# Patient Record
Sex: FEMALE | Race: WHITE
[De-identification: names, ages, dates, MRNs, and addresses within clinical notes are randomized per-mention and may not be internally consistent; named-entity substitution may affect disease eponyms.]

---

## 2018-08-08 ENCOUNTER — HOSPITAL ENCOUNTER (EMERGENCY)
Dept: HOSPITAL 65 - ER | Age: 53
Discharge: HOME | End: 2018-08-08
Payer: COMMERCIAL

## 2018-08-08 VITALS — BODY MASS INDEX: 31.65 KG/M2 | HEIGHT: 65 IN | WEIGHT: 190 LBS

## 2018-08-08 VITALS — DIASTOLIC BLOOD PRESSURE: 77 MMHG | SYSTOLIC BLOOD PRESSURE: 132 MMHG

## 2018-08-08 VITALS — SYSTOLIC BLOOD PRESSURE: 178 MMHG | DIASTOLIC BLOOD PRESSURE: 91 MMHG

## 2018-08-08 DIAGNOSIS — Z90.710: ICD-10-CM

## 2018-08-08 DIAGNOSIS — F45.8: ICD-10-CM

## 2018-08-08 DIAGNOSIS — R79.1: ICD-10-CM

## 2018-08-08 DIAGNOSIS — Z90.49: ICD-10-CM

## 2018-08-08 DIAGNOSIS — F41.9: Primary | ICD-10-CM

## 2018-08-08 DIAGNOSIS — I10: ICD-10-CM

## 2018-08-08 DIAGNOSIS — Z79.899: ICD-10-CM

## 2018-08-08 LAB
ALP INTEST CFR SERPL: 77 U/L (ref 50–136)
ALT SERPL-CCNC: 106 U/L (ref 12–78)
APPEARANCE UR: CLEAR
AST SERPL-CCNC: 59 U/L (ref 0–35)
BASE EXCESS BLDA CALC-SCNC: 4.7 MMOL/L (ref -2–2)
BASOPHILS # BLD AUTO: 0 10^3/UL (ref 0–0.1)
BASOPHILS NFR BLD AUTO: 0.1 % (ref 0–0.2)
BILIRUB UR STRIP.AUTO-MCNC: NEGATIVE MG/DL
CALCIUM SERPL-MCNC: 9 MG/DL (ref 8.4–10.5)
CO2 BLDA-SCNC: 28.2 MMOL/L (ref 20–32)
COLOR UR: YELLOW
EOSINOPHIL # BLD AUTO: 0.2 10^3/UL (ref 0–0.2)
EOSINOPHIL NFR BLD AUTO: 2.1 % (ref 0–5)
ERYTHROCYTE [DISTWIDTH] IN BLOOD BY AUTOMATED COUNT: 12.6 % (ref 11.5–14.5)
GLUCOSE PRE 100 G GLC PO SERPL-MCNC: 123 MG/DL (ref 70–110)
HCO3 BLDA-SCNC: 21.9 MMOL/L (ref 22–26)
HGB BLD-MCNC: 12.6 G/DL (ref 12–15)
LYMPHOCYTES # BLD AUTO: 2.2 10^3/UL (ref 1–4.8)
LYMPHOCYTES NFR BLD AUTO: 27.7 % (ref 24–44)
MCH RBC QN AUTO: 30.2 PG (ref 26–34)
MCHC RBC AUTO-ENTMCNC: 33.4 G/DL (ref 33–37)
MCV RBC AUTO: 90.4 FL (ref 78–100)
MONOCYTES # BLD AUTO: 0.5 10^3/UL (ref 0.3–0.8)
MONOCYTES NFR BLD AUTO: 6.5 % (ref 5–12)
NEUTROPHILS # BLD AUTO: 5 10^3/UL (ref 1.8–7.7)
NEUTROPHILS NFR BLD AUTO: 63.3 % (ref 41–85)
PCO2 BLDA: 17.4 MMHG (ref 35–45)
PH BLDA: 7.72 [PH] (ref 7.35–7.45)
PLATELET # BLD AUTO: 263 10^3/UL (ref 150–400)
PMV BLD AUTO: 11.1 FL (ref 7.8–11)
UROBILINOGEN UR QL STRIP.AUTO: NORMAL
WBC # BLD AUTO: 8 10^3/UL (ref 4.5–11)

## 2018-08-08 PROCEDURE — 71045 X-RAY EXAM CHEST 1 VIEW: CPT

## 2018-08-08 PROCEDURE — 80053 COMPREHEN METABOLIC PANEL: CPT

## 2018-08-08 PROCEDURE — 85025 COMPLETE CBC W/AUTO DIFF WBC: CPT

## 2018-08-08 PROCEDURE — 99285 EMERGENCY DEPT VISIT HI MDM: CPT

## 2018-08-08 PROCEDURE — 81002 URINALYSIS NONAUTO W/O SCOPE: CPT

## 2018-08-08 PROCEDURE — 93005 ELECTROCARDIOGRAM TRACING: CPT

## 2018-08-08 PROCEDURE — 82803 BLOOD GASES ANY COMBINATION: CPT

## 2018-08-08 PROCEDURE — 84484 ASSAY OF TROPONIN QUANT: CPT

## 2018-08-08 PROCEDURE — 36415 COLL VENOUS BLD VENIPUNCTURE: CPT

## 2018-08-08 PROCEDURE — 85610 PROTHROMBIN TIME: CPT

## 2018-08-08 NOTE — NUR
RESTROOM



TAKEN TO RESTROOM VIA WHEELCHAIR, STATES SHE IS FEELING A LITTLE BIT BETTER NOW 
THAT SHE HAS RESTED IN THE BED.

TAKEN BACK TO ROOM, RECONNECTED TO BEDSIDE MONITOR

## 2018-08-08 NOTE — PCM.EKG
[facility]

                                       

Test Date:    2018               Test Time:    22:33:53

Pat Name:     JESÚS VENTURA             Department:   

Patient ID:   [id number]          Room:          

Gender:       F                        Technician:   MA

:          1965               Requested By: ALTON MERLOS

Order Number: [id number]           Reading MD:   Joann Lee

                                 Measurements

Intervals                              Axis          

Rate:         63                       P:            

CA:           112                      QRS:          5

QRSD:         88                       T:            7

QT:           458                                    

QTc:          468                                    

                           Interpretive Statements

Normal sinus rhythm

Normal ECG

No previous ECG available for comparison



Electronically Signed On 2018 2:19:19 CDT by Joann Lee



Please click the below link to view image of tracing.

## 2018-08-08 NOTE — DIREP
PROCEDURE:CHEST 1 VIEW

 

COMPARISON:[facility], CR, XRAY CHEST SINGLE VW, 11/13/2016, 

06:19 PM.

 

INDICATIONS:SOB

 

FINDINGS:

LUNGS/PLEURA:No significant pulmonary parenchymal abnormalities. No effusions.

VASCULATURE:Normal.  Unremarkable pulmonary vasculature.

CARDIAC:Normal.  No cardiac silhouette abnormality or cardiomegaly. 

MEDIASTINUM:Normal.  No visible mass or adenopathy. 

BONES:Normal.  No fracture or visible bony lesion. 

OTHER:Negative.  

 

CONCLUSION:No acute cardiopulmonary findings.  

 

 

 

 

Dictated by: Foreign Dunbar M.D. on 08/08/2018 at 10:14 PM     

Electronically Signed By: Foreign Dunbar M.D. on 08/08/2018 at 10:15 PM

## 2018-08-08 NOTE — ER.PDOC
General


Chief Complaint:  Syncope


Stated Complaint:  LIGHTHEADEDNESS


TRAVEL OUT OF US:  No


Time seen by MD:  21:42


Source:  patient


Exam Limitations:  no limitations





History of Present Illness


Initial Comments


Gen weakness, light headed.  Difficulty getting a full breath in.  Occas 

numbness and tingling arms and handsl.  No chest discomfort.  No nausea, no 

diaph.  Admits increased stress on job.


Timing/Duration:  other (1 month.  Not getting worse.)


Severity:  mild, moderate


Associated Symptoms:  denies symptoms


Allergies:  


Coded Allergies:  


     No Known Allergies (Unverified , 11/13/16)


Home Meds


Reported Medications


Esomeprazole Magnesium (NEXIUM) 40 Mg Capsule.dr, 1 CAP PO DAILY, #30 CAP 5 

Refills


   11/13/16


Duloxetine Hcl (CYMBALTA) 60 Mg Capsule.dr, 1 CAP PO DAILY, #90 CAP 3 Refills


   11/13/16


Furosemide (LASIX) 40 Mg Tablet, 1 TAB PO DAILY, #90 TAB 1 Refill


   11/13/16





Past Medical History


Medical History:  hypertension


Surgical History:  appendectomy, hysterectomy, other


LMP (females 10-50):  hysterectomy





Social History


Smoking:  non-smoker


Alcohol Use:  occassionally


Drug Use:  none





Review of Systems


Constitutional:  see HPI


EENTM:  no symptoms reported


Respiratory:  see HPI; denies cough


Cardiovascular:  see HPI; denies chest pain


Gastrointestinal:  no symptoms reported


Genitourinary:  no symptoms reported


Psychiatric/Neurological:  see HPI, anxiety


All Other Systems:  Reviewed and Negative





Physical Exam


General Appearance:  No Apparent Distress, Anxious


EENT:  eyes nml inspection, nml ENT inspection


Neck:  Non-Tender, Full Range of Motion, Normal Inspection


Respiratory:  chest non-tender, lungs clear, normal breath sounds (Pt. actively 

hyperventilating.)


CVS:  reg rate & rhythm, no murmur, no gallop, pulses nml


Gastrointestinal:  Normal Bowel Sounds, No Organomegaly, No Pulsatile Mass


Back:  Normal Inspection, No CVA Tenderness, No Vertebral Tenderness


Extremities:  Normal Range of Motion, Non-Tender, Normal Inspection, No Pedal 

Edema


Neurologic/Psychiatric:  CNs II-XII NML as Tested, No Motor/Sensory Deficits, 

Alert, Oriented x 3, Depressed Affect


Skin:  Normal Color, Warm/Dry


Lymphatic:  No Adenopathy





Results/Orders


Results/Orders





Laboratory Tests








Test


 8/8/18


21:45 8/8/18


21:52 8/8/18


22:43


 


Blood Gas Sample Site


 LEFT RADIAL


ARTERY 


 





 


Blood Gas pH


 7.718


(7.350-7.450) 


 





 


Blood Gas PCO2


 17.4 mmHg


(35.0-45.0) 


 





 


Blood Gas PO2


 116.1 mmHg


(75.0-100.0) 


 





 


Blood Gas HCO3


 21.9 mmol/L


(22.0-26.0) 


 





 


Blood Gas Base Excess


 4.7 mmol/L


(-2.0-2.0) 


 





 


Elio Test POSITIVE   


 


Arterial Blood Oxygen


Saturation 98.5 % (95-) 


 


 





 


Deoxyhemoglobin


 1.5 %


(0.2-0.6) 


 





 


Carboxyhemoglobin


 0.4 %


(0.5-1.5) 


 





 


Methemoglobin


 0.0 %


(0.2-0.6) 


 





 


Total Hemoglobin


 13.8 %


(13.5-17.5) 


 





 


Total Oxygen Concentration


 19.2 %


(13.5-17.5) 


 





 


Lactic Acid (Blood Gas)


 1.3 MMOL/L


(0.5-1.0) 


 





 


Blood Gas Temperature 37.0   


 


FiO2


 0.21 %


() 


 





 


Bicarbonate


 22.4 mmol/L


(23-27) 


 





 


White Blood Count


 


 8.0 10^3/uL


(4.5-11.0) 





 


Red Blood Count


 


 4.17 10^6/uL


(4.00-5.20) 





 


Hemoglobin


 


 12.6 g/dL


(12.0-15.0) 





 


Hematocrit


 


 37.7 %


(36.0-46.0) 





 


Mean Corpuscular Volume


 


 90.4 fL


() 





 


Mean Corpuscular Hemoglobin


 


 30.2 pg


(26-34) 





 


Mean Corpuscular Hemoglobin


Concent 


 33.4 g/dL


(33-37) 





 


Red Cell Distribution Width


 


 12.6 %


(11.5-14.5) 





 


Platelet Count


 


 263 10^3/uL


(150-400) 





 


Mean Platelet Volume


 


 11.1 fL


(7.8-11.0) 





 


Neutrophils (%) (Auto)


 


 63.3 %


(41.0-85.0) 





 


Lymphocytes (%) (Auto)


 


 27.7 %


(24.0-44.0) 





 


Monocytes (%) (Auto)


 


 6.5 %


(5.0-12.0) 





 


Neutrophils # (Auto)


 


 5.0 10^3/uL


(1.8-7.7) 





 


Lymphocytes # (Auto)


 


 2.2 10^3/uL


(1.0-4.8) 





 


Monocytes # (Auto)


 


 0.5 10^3/uL


(0.3-0.8) 





 


Absolute Immature Granulocyte


(auto 


 0.02 10^3 u/L


(0-2) 





 


Eosinophils %


 


 2.1 %


(0.0-5.0) 





 


Basophils %


 


 0.1 %


(0.0-0.2) 





 


Basophils #


 


 0.0 10^3/uL


(0.0-0.1) 





 


Eosinophil Count


 


 0.2 10^3/uL


(0.0-0.2) 





 


Prothrombin Time


 


 9.3 SEC


(9.8-11.9) 





 


Prothrombin Time INR


(Non-Therap) 


 0.9 


 





 


Sodium Level


 


 141 mmol/L


(132-145) 





 


Potassium Level


 


 3.2 mmol/L


(3.6-5.2) 





 


Chloride Level


 


 102.0 mmol/L


() 





 


Carbon Dioxide Level


 


 28.2 mmol/L


(20.0-32) 





 


Anion Gap  14.0  


 


Blood Urea Nitrogen


 


 14 mg/dL


(7-18) 





 


Creatinine


 


 0.93 mg/dL


(0.59-1.40) 





 


Estimated GFR (


American) 


 76.6 (>/=60) 


 





 


BUN/Creatinine Ratio  15.0  


 


Glucose Level


 


 123 mg/dL


() 





 


Calcium Level


 


 9.0 mg/dL


(8.4-10.5) 





 


Total Bilirubin


 


 0.4 mg/dL


(0.2-1.0) 





 


Aspartate Amino Transf


(AST/SGOT) 


 59 U/L (0-35) 


 





 


Alanine Aminotransferase


(ALT/SGPT) 


 106 U/L


(12-78) 





 


Alkaline Phosphatase


 


 77 U/L


() 





 


Troponin I


 


 < 0.02 ng/mL


(0.00-0.05) 





 


Total Protein


 


 7.4 g/dL


(6.4-8.2) 





 


Albumin


 


 3.6 g/dL


(3.4-5.0) 





 


Globulin  3.8  


 


Percent Immature Gran (Cell


Imm) 


 0.30 %


(0.00-0.50) 





 


Urine Collection Type   UNKNOWN 


 


Urine Color


 


 


 YELLOW


(YELLOW)


 


Urine Appearance   CLEAR (CLEAR) 


 


Urine Bilirubin


 


 


 NEGATIVE MG/DL


(NEGATIVE)


 


Urine Ketones


 


 


 NEGATIVE


(NEGATIVE)


 


Urine Specific Gravity


 


 


 1.015


(1.005-1.035)


 


Urine pH   7 (5.0-6.0) 


 


Urine Protein


 


 


 NEGATIVE


(NEGATIVE)


 


Urine Urobilinogen


 


 


 NORMAL


(NEGATIVE)


 


Urine Nitrate


 


 


 NEGATIVE


(NEGATIVE)


 


Urine Leukocyte Esterase


 


 


 NEGATIVE


(NEGATIVE)


 


Urine Blood


 


 


 NEGATIVE


(NEGATIVE)


 


Urine Glucose


 


 


 NORMAL


(NEGATIVE)











Progress


Progress


Troponin, EKG nml.  Atypical prsentation for cardiac etiology.  More typical 

for anxiety.  ABG c/w hyperventilation with resp alkylosis.  Remainder labs and 

UA nml.





EKG/XRAY/CT/US


EKG:  NSR


EKG Comments:  63/min.  Nml axis, nml EKG.


XRAY:  chest


XRAY Comments:  nml





Course


Vitals & review Data





Vital Sign - Last 24 Hours








 8/8/18 8/8/18 8/8/18 8/8/18





 21:34 21:34 21:40 22:47


 


Temp 98.1 98.1 98.1 





 98.1 98.1 98.1 


 


Pulse 67 67 67 62


 


Resp 16 16 16 16


 


B/P (MAP)   178/91 (120) 132/77 (95)


 


Pulse Ox 97  97 98


 


O2 Delivery Room Air  Room Air Room Air








Laboratory Tests








Test


 8/8/18


21:45 8/8/18


21:52 8/8/18


22:43


 


Blood Gas Sample Site


 LEFT RADIAL


ARTERY 


 





 


Blood Gas pH 7.718   


 


Blood Gas PCO2 17.4 mmHg   


 


Blood Gas PO2 116.1 mmHg   


 


Blood Gas HCO3 21.9 mmol/L   


 


Blood Gas Base Excess 4.7 mmol/L   


 


Elio Test POSITIVE   


 


Arterial Blood Oxygen


Saturation 98.5 % 


 


 





 


Deoxyhemoglobin 1.5 %   


 


Carboxyhemoglobin 0.4 %   


 


Methemoglobin 0.0 %   


 


Total Hemoglobin 13.8 %   


 


Total Oxygen Concentration 19.2 %   


 


Lactic Acid (Blood Gas) 1.3 MMOL/L   


 


Blood Gas Temperature 37.0   


 


FiO2 0.21 %   


 


Bicarbonate 22.4 mmol/L   


 


White Blood Count  8.0 10^3/uL  


 


Red Blood Count  4.17 10^6/uL  


 


Hemoglobin  12.6 g/dL  


 


Hematocrit  37.7 %  


 


Mean Corpuscular Volume  90.4 fL  


 


Mean Corpuscular Hemoglobin  30.2 pg  


 


Mean Corpuscular Hemoglobin


Concent 


 33.4 g/dL 


 





 


Red Cell Distribution Width  12.6 %  


 


Platelet Count  263 10^3/uL  


 


Mean Platelet Volume  11.1 fL  


 


Neutrophils (%) (Auto)  63.3 %  


 


Lymphocytes (%) (Auto)  27.7 %  


 


Monocytes (%) (Auto)  6.5 %  


 


Neutrophils # (Auto)  5.0 10^3/uL  


 


Lymphocytes # (Auto)  2.2 10^3/uL  


 


Monocytes # (Auto)  0.5 10^3/uL  


 


Absolute Immature Granulocyte


(auto 


 0.02 10^3 u/L 


 





 


Eosinophils %  2.1 %  


 


Basophils %  0.1 %  


 


Basophils #  0.0 10^3/uL  


 


Eosinophil Count  0.2 10^3/uL  


 


Prothrombin Time  9.3 SEC  


 


Prothrombin Time INR


(Non-Therap) 


 0.9 


 





 


Sodium Level  141 mmol/L  


 


Potassium Level  3.2 mmol/L  


 


Chloride Level  102.0 mmol/L  


 


Carbon Dioxide Level  28.2 mmol/L  


 


Anion Gap  14.0  


 


Blood Urea Nitrogen  14 mg/dL  


 


Creatinine  0.93 mg/dL  


 


Estimated GFR (


American) 


 76.6 


 





 


BUN/Creatinine Ratio  15.0  


 


Glucose Level  123 mg/dL  


 


Calcium Level  9.0 mg/dL  


 


Total Bilirubin  0.4 mg/dL  


 


Aspartate Amino Transf


(AST/SGOT) 


 59 U/L 


 





 


Alanine Aminotransferase


(ALT/SGPT) 


 106 U/L 


 





 


Alkaline Phosphatase  77 U/L  


 


Troponin I  < 0.02 ng/mL  


 


Total Protein  7.4 g/dL  


 


Albumin  3.6 g/dL  


 


Globulin  3.8  


 


Percent Immature Gran (Cell


Imm) 


 0.30 % 


 





 


Urine Collection Type   UNKNOWN 


 


Urine Color   YELLOW 


 


Urine Appearance   CLEAR 


 


Urine Bilirubin   NEGATIVE MG/DL 


 


Urine Ketones   NEGATIVE 


 


Urine Specific Gravity   1.015 


 


Urine pH   7 


 


Urine Protein   NEGATIVE 


 


Urine Urobilinogen   NORMAL 


 


Urine Nitrate   NEGATIVE 


 


Urine Leukocyte Esterase   NEGATIVE 


 


Urine Blood   NEGATIVE 


 


Urine Glucose   NORMAL 











Departure


Time of Disposition:  22:57


Disposition:  01 HOME, SELF-CARE


Impression:  


 Primary Impression:  


 Anxiety


 Additional Impression:  


 Hyperventilation syndrome


Condition:  Stable


Patient Instructions:  Anxiety and Panic Attacks


Referrals:  


MONIQUE SANCHES MD (PCP)


PRIMARY CARE PROVIDER





Additional Instructions:  


No caffeine or other stimulant drugs or medications.


See your doctor in 2 to 3 days for recheck.


Return if worse.


Duration or Time Spent with Pa:  120











ALTON MERLOS DO Aug 8, 2018 21:50
- - -

## 2018-08-09 VITALS — DIASTOLIC BLOOD PRESSURE: 77 MMHG | SYSTOLIC BLOOD PRESSURE: 132 MMHG

## 2018-08-23 ENCOUNTER — HOSPITAL ENCOUNTER (EMERGENCY)
Dept: HOSPITAL 65 - ER | Age: 53
Discharge: HOME | End: 2018-08-23
Payer: COMMERCIAL

## 2018-08-23 VITALS — SYSTOLIC BLOOD PRESSURE: 166 MMHG | DIASTOLIC BLOOD PRESSURE: 104 MMHG

## 2018-08-23 VITALS — SYSTOLIC BLOOD PRESSURE: 178 MMHG | DIASTOLIC BLOOD PRESSURE: 103 MMHG

## 2018-08-23 VITALS — SYSTOLIC BLOOD PRESSURE: 166 MMHG | DIASTOLIC BLOOD PRESSURE: 102 MMHG

## 2018-08-23 VITALS — WEIGHT: 190 LBS | HEIGHT: 65 IN | BODY MASS INDEX: 31.65 KG/M2

## 2018-08-23 VITALS — DIASTOLIC BLOOD PRESSURE: 92 MMHG | SYSTOLIC BLOOD PRESSURE: 159 MMHG

## 2018-08-23 VITALS — DIASTOLIC BLOOD PRESSURE: 103 MMHG | SYSTOLIC BLOOD PRESSURE: 178 MMHG

## 2018-08-23 DIAGNOSIS — K21.9: ICD-10-CM

## 2018-08-23 DIAGNOSIS — S10.93XA: ICD-10-CM
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
## 2018-08-23 DIAGNOSIS — S00.93XA: ICD-10-CM

## 2018-08-23 DIAGNOSIS — S20.219A: Primary | ICD-10-CM
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
## 2018-08-23 DIAGNOSIS — Z90.710: ICD-10-CM
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
## 2018-08-23 DIAGNOSIS — Z79.899: ICD-10-CM

## 2018-08-23 DIAGNOSIS — I10: ICD-10-CM

## 2018-08-23 DIAGNOSIS — R79.1: ICD-10-CM

## 2018-08-23 DIAGNOSIS — Z90.49: ICD-10-CM

## 2018-08-23 DIAGNOSIS — S30.1XXA: ICD-10-CM

## 2018-08-23 LAB
ALP INTEST CFR SERPL: 82 U/L (ref 50–136)
ALT SERPL-CCNC: 106 U/L (ref 12–78)
AST SERPL-CCNC: 46 U/L (ref 0–35)
BASOPHILS # BLD AUTO: 0 10^3/UL (ref 0–0.1)
BASOPHILS NFR BLD AUTO: 0.3 % (ref 0–0.2)
CALCIUM SERPL-MCNC: 9.5 MG/DL (ref 8.4–10.5)
CO2 BLDA-SCNC: 27.6 MMOL/L (ref 20–32)
EOSINOPHIL # BLD AUTO: 0.2 10^3/UL (ref 0–0.2)
EOSINOPHIL NFR BLD AUTO: 1.7 % (ref 0–5)
ERYTHROCYTE [DISTWIDTH] IN BLOOD BY AUTOMATED COUNT: 12.9 % (ref 11.5–14.5)
GLUCOSE PRE 100 G GLC PO SERPL-MCNC: 148 MG/DL (ref 70–110)
HGB BLD-MCNC: 13.4 G/DL (ref 12–15)
LYMPHOCYTES # BLD AUTO: 1.8 10^3/UL (ref 1–4.8)
LYMPHOCYTES NFR BLD AUTO: 20.6 % (ref 24–44)
MCH RBC QN AUTO: 30.2 PG (ref 26–34)
MCHC RBC AUTO-ENTMCNC: 33.8 G/DL (ref 33–37)
MCV RBC AUTO: 89.2 FL (ref 78–100)
MONOCYTES # BLD AUTO: 0.5 10^3/UL (ref 0.3–0.8)
MONOCYTES NFR BLD AUTO: 5.5 % (ref 5–12)
NEUTROPHILS # BLD AUTO: 6.2 10^3/UL (ref 1.8–7.7)
NEUTROPHILS NFR BLD AUTO: 71.8 % (ref 41–85)
PLATELET # BLD AUTO: 286 10^3/UL (ref 150–400)
PMV BLD AUTO: 11.5 FL (ref 7.8–11)
WBC # BLD AUTO: 8.7 10^3/UL (ref 4.5–11)

## 2018-08-23 PROCEDURE — 93005 ELECTROCARDIOGRAM TRACING: CPT

## 2018-08-23 PROCEDURE — 72125 CT NECK SPINE W/O DYE: CPT

## 2018-08-23 PROCEDURE — 70450 CT HEAD/BRAIN W/O DYE: CPT

## 2018-08-23 PROCEDURE — 99285 EMERGENCY DEPT VISIT HI MDM: CPT

## 2018-08-23 PROCEDURE — 85610 PROTHROMBIN TIME: CPT

## 2018-08-23 PROCEDURE — 71260 CT THORAX DX C+: CPT

## 2018-08-23 PROCEDURE — 85730 THROMBOPLASTIN TIME PARTIAL: CPT

## 2018-08-23 PROCEDURE — 74177 CT ABD & PELVIS W/CONTRAST: CPT

## 2018-08-23 PROCEDURE — 80053 COMPREHEN METABOLIC PANEL: CPT

## 2018-08-23 PROCEDURE — 36415 COLL VENOUS BLD VENIPUNCTURE: CPT

## 2018-08-23 PROCEDURE — 84484 ASSAY OF TROPONIN QUANT: CPT

## 2018-08-23 PROCEDURE — 85025 COMPLETE CBC W/AUTO DIFF WBC: CPT

## 2018-08-23 NOTE — PCM.EKG
[facility]

                                       

Test Date:    2018               Test Time:    19:00:01

Pat Name:     JESÚS VENTURA             Department:   

Patient ID:   [facility]-[id number]          Room:          

Gender:       F                        Technician:   

:          1965               Requested By: CURT BRODY

Order Number: [id number]           Reading MD:   Curt BORDY

                                 Measurements

Intervals                              Axis          

Rate:         57                       P:            50

VT:           156                      QRS:          26

QRSD:         82                       T:            37

QT:           442                                    

QTc:          430                                    

                           Interpretive Statements

Sinus bradycardia

Otherwise normal ECG

Compared to ECG 2018 22:33:53

Sinus rhythm no longer present



Electronically Signed On 2018 3:53:31 CDT by Curt BRODY



Please click the below link to view image of tracing.

## 2018-08-23 NOTE — NUR
IV



IV removed, tip intact. Cotton ball placed over IV site with coban to secure 
cotton. Instructed pt. to remove when she arrives home. Pt. expressed 
understand
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
## 2020-02-25 ENCOUNTER — HOSPITAL ENCOUNTER (EMERGENCY)
Dept: HOSPITAL 65 - ER | Age: 55
Discharge: HOME | End: 2020-02-25
Payer: COMMERCIAL

## 2020-02-25 VITALS — SYSTOLIC BLOOD PRESSURE: 179 MMHG | DIASTOLIC BLOOD PRESSURE: 87 MMHG

## 2020-02-25 VITALS — WEIGHT: 180 LBS | HEIGHT: 65 IN | BODY MASS INDEX: 29.99 KG/M2

## 2020-02-25 VITALS — DIASTOLIC BLOOD PRESSURE: 92 MMHG | SYSTOLIC BLOOD PRESSURE: 156 MMHG

## 2020-02-25 DIAGNOSIS — R00.1: ICD-10-CM

## 2020-02-25 DIAGNOSIS — R42: Primary | ICD-10-CM

## 2020-02-25 LAB
ALP INTEST CFR SERPL: 68 U/L (ref 50–136)
ALT SERPL-CCNC: 23 U/L (ref 12–78)
AST SERPL-CCNC: 10 U/L (ref 0–35)
BASOPHILS # BLD AUTO: 0.1 10^3/UL (ref 0–0.1)
BASOPHILS NFR BLD AUTO: 0.4 % (ref 0–0.2)
CALCIUM SERPL-MCNC: 9.9 MG/DL (ref 8.4–10.5)
CO2 BLDA-SCNC: 31.7 MMOL/L (ref 20–32)
EOSINOPHIL # BLD AUTO: 0.1 10^3/UL (ref 0–0.2)
EOSINOPHIL NFR BLD AUTO: 0.6 % (ref 0–5)
ERYTHROCYTE [DISTWIDTH] IN BLOOD BY AUTOMATED COUNT: 12.3 % (ref 11.5–14.5)
GLUCOSE PRE 100 G GLC PO SERPL-MCNC: 128 MG/DL (ref 70–110)
LYMPHOCYTES # BLD AUTO: 1.75 10^3/UL1 (ref 1–4.8)
LYMPHOCYTES NFR BLD AUTO: 15 % (ref 24–44)
MCH RBC QN AUTO: 29 PG (ref 26–34)
MONOCYTES # BLD AUTO: 0.7 10^3/UL (ref 0.3–0.8)
MONOCYTES NFR BLD AUTO: 6.4 % (ref 5–12)
NEUTROPHILS # BLD AUTO: 9 10^3/UL (ref 1.8–7.7)
NEUTROPHILS NFR BLD AUTO: 77.3 % (ref 41–85)
PLATELET # BLD AUTO: 310 10^3/UL (ref 150–400)

## 2020-02-25 PROCEDURE — 80053 COMPREHEN METABOLIC PANEL: CPT

## 2020-02-25 PROCEDURE — 99285 EMERGENCY DEPT VISIT HI MDM: CPT

## 2020-02-25 PROCEDURE — 36415 COLL VENOUS BLD VENIPUNCTURE: CPT

## 2020-02-25 PROCEDURE — 70450 CT HEAD/BRAIN W/O DYE: CPT

## 2020-02-25 PROCEDURE — 84484 ASSAY OF TROPONIN QUANT: CPT

## 2020-02-25 PROCEDURE — 93005 ELECTROCARDIOGRAM TRACING: CPT

## 2020-02-25 PROCEDURE — 82550 ASSAY OF CK (CPK): CPT

## 2020-02-25 PROCEDURE — 96374 THER/PROPH/DIAG INJ IV PUSH: CPT

## 2020-02-25 PROCEDURE — 71045 X-RAY EXAM CHEST 1 VIEW: CPT

## 2020-02-25 PROCEDURE — 85025 COMPLETE CBC W/AUTO DIFF WBC: CPT

## 2020-02-25 PROCEDURE — 96375 TX/PRO/DX INJ NEW DRUG ADDON: CPT

## 2020-02-25 PROCEDURE — 82553 CREATINE MB FRACTION: CPT

## 2020-02-25 NOTE — DIREP
PROCEDURE:CT HEAD OR BRAIN W/O CONTRAST

 

COMPARISON:[facility], CT, CT HEAD BRAIN W/O CONTRAST, 

08/23/2018, 07:29 PM.

 

INDICATIONS:dizziness

 

TECHNIQUE:CT images were created without intravenous contrast.  

 

FINDINGS:

VENTRICLES:The ventricles are normal in size and configuration.  

CEREBRUM:Normal cerebral morphology with appropriate gray white matter 

differentiation.  

CEREBELLUM:Negative.  

BRAINSTEM:Negative.  

BASAL CISTERNS:Negative.  

 

HEMORRHAGE:No  

MASS LESION:No  

ACUTE INFARCT:No  

 

SKULL:Normal.  

SINUSES:Normal.  

OTHER:None  

 

CONCLUSION:No acute intracranial findings.

 

 

 

 

Dictated by: Foreign Dunbar M.D. on 02/25/2020 at 04:18 AM     

Electronically Signed By: Foreign Dunbar M.D. on 02/25/2020 at 04:20 AM

## 2020-02-25 NOTE — ER.PDOC
General


Chief Complaint:  Requesting Medical Care


Stated Complaint:  DIZZINESS, NAUSEA


Time seen by MD:  03:42


Source:  patient


Exam Limitations:  no limitations





History of Present Illness


Initial Comments


dizziness like the room is spinning to the left, worse with head movement or 

movement of the eyes, no lightheadedness like fainting, no chest pain or palpita

tions, no ear pain or ringing in the ears or hearing loss, no headache. no 

weakness or sensation change, symptoms since 8am yesterday


Occurred:  other


Associated Symptoms:  spinning


Usually:  walks w/o assistance


Worsened By:  movement of head


Allergies:  


Coded Allergies:  


     No Known Allergies (Unverified , 11/13/16)


Home Meds


Reported Medications


Esomeprazole Magnesium (NEXIUM) 40 Mg Capsule.dr, 1 CAP PO DAILY, #30 CAP 5 

Refills


   11/13/16


Duloxetine Hcl (CYMBALTA) 60 Mg Capsule.dr, 1 CAP PO DAILY, #90 CAP 3 Refills


   11/13/16


Furosemide (LASIX) 40 Mg Tablet, 1 TAB PO DAILY, #90 TAB 1 Refill


   11/13/16





Past Medical History


Surgical History:  appendectomy, hysterectomy





Social History


Drug Use:  none





Review of Systems


Constitutional:  denies chills, denies fever


Eyes:  denies blindness


Ears:  dizziness; denies pain


Nose:  denies congestion


Mouth:  denies pain


Throat:  denies neck stiffness


Respiratory:  denies shortness of breath


Cardiovascular:  denies chest pain, denies irregular heart rate, denies 

lightheadedness, denies palpitations, denies syncope


Gastrointestinal:  denies abdominal pain, denies diarrhea; nausea; denies 

vomiting


Genitourinary:  denies pain


Musculoskeletal:  denies neck pain





Physical Exam


General Appearance:  alert, no distress


EENT:  PERRL, nystagmus


Neck:  supple


Respiratory:  no resp distress, breath sounds nml


CVS:  reg rate & rhythm, heart sounds.nml


Abdomen:  non-tender


Skin:  color nml


Extremities:  non-tender


Neuro/Psych:  nml orientation


Cranial Nerves:  nml as tested, no evidence of acute CVA


Cerebellar:  nml as tested


Sensorimotor:  nml motor





Results/Orders


Results/Orders





Orders - ODETTE GREY MD


Cbc With Auto Diff (2/25/20 03:50)


Comprehensive Metabolic Panel (2/25/20 03:50)


Creatine Kinase (2/25/20 03:50)


Creatine Kinase Mb (2/25/20 03:50)


Troponin I (2/25/20 03:50)


Xr Chest 1v (2/25/20 03:50)


Ekg-Routine (2/25/20 03:50)


Saline Lock (2/25/20 03:50)


Ct Head Wo Contrast (2/25/20 03:50)


Diphenhydramine Hcl (Benadryl) (2/25/20 03:50)


Ondansetron Hcl/Pf (Zofran) (2/25/20 04:00)





Vital Signs








  Date Time  Temp Pulse Resp B/P (MAP) Pulse Ox O2 Delivery O2 Flow Rate FiO2


 


2/25/20 03:47 97.6 54 16     


 


2/25/20 03:47 97.6 54 16 179/87 (117) 99 Room Air  


 


2/25/20 03:47 97.6 54 16  99   








Administered Medications








 Medications


  (Trade)  Dose


 Ordered  Sig/Kristyn


 Route


 PRN Reason  Start Time


 Stop Time Status Last Admin


Dose Admin


 


 Diphenhydramine


 HCl


  (Benadryl)  25 mg  STAT  STAT


 IV


   2/25/20 03:50


 2/25/20 03:51 UNV 2/25/20 04:03


25 MG


 


 Ondansetron HCl


  (Zofran)  4 mg  Q4H  PRN


 IV


 NAUSEA / VOMITING  2/25/20 04:00


 3/26/20 03:59 UNV 2/25/20 04:03


4 MG








                                Laboratory Tests








Test


 2/25/20


03:58


 


White Blood Count


 11.6 10^3/uL


(4.5-11.0)  H


 


Red Blood Count


 4.52 10^6/uL


(4.00-5.20)


 


Hemoglobin


 13.1 g/dL


(12.0-15.0)


 


Hematocrit


 39.7 %


(36.0-46.0)


 


Mean Corpuscular Volume


 87.8 fL


()


 


Mean Corpuscular Hemoglobin


 29.0 pg


(26-34)


 


Mean Corpuscular Hemoglobin


Concent 33.0 g/dL


(33-36.5)


 


Red Cell Distribution Width


 12.3 %


(11.5-14.5)


 


Platelet Count


 310 10^3/uL


(150-400)


 


Mean Platelet Volume


 11.9 fL


(7.8-11.0)  H


 


Neutrophils (%) (Auto)


 77.3 %


(41.0-85.0)


 


Lymphocytes (%) (Auto)


 15.0 %


(24.0-44.0)  L


 


Monocytes (%) (Auto)


 6.4 %


(5.0-12.0)


 


Neutrophils # (Auto)


 9.0 10^3/uL


(1.8-7.7)  H


 


Lymphocytes # (Auto)


 1.75 10^3/uL1


(1.0-4.8)


 


Monocytes # (Auto)


 0.7 10^3/uL


(0.3-0.8)


 


Absolute Immature Granulocyte


(auto 0.03 10^3 u/L


(0-2)


 


Absolute Eosinophils (auto)


 0.1 10^3/uL


(0.0-0.2)


 


Immature Granulocytes %


 0.30 %


(0.00-0.50)


 


Eosinophils %


 0.6 %


(0.0-5.0)


 


Basophils %


 0.4 %


(0.0-0.2)  H


 


Basophils #


 0.1 10^3/uL


(0.0-0.1)


 


Sodium Level


 142 mmol/L


(132-145)


 


Potassium Level


 3.8 mmol/L


(3.6-5.2)


 


Chloride Level


 104.0 mmol/L


()


 


Carbon Dioxide Level


 31.7 mmol/L


(20.0-32)


 


Anion Gap 10.1  


 


Blood Urea Nitrogen


 17 mg/dL


(7-18)


 


Creatinine


 0.94 mg/dL


(0.59-1.40)


 


Estimated GFR (


American) 75.4 (>/=60)  





 


Est GFR (CKD-EPI)(Non-Afr


American) 62.3 (>/=60)  





 


BUN/Creatinine Ratio 18.0  


 


Glucose Level


 128 mg/dL


()  H


 


Calcium Level


 9.9 mg/dL


(8.4-10.5)


 


Total Bilirubin


 0.5 mg/dL


(0.2-1.0)


 


Aspartate Amino Transferase


(AST) 10 U/L (0-35)  





 


Alanine Aminotransferase (ALT)


 23 U/L (12-78)





 


Alkaline Phosphatase


 68 U/L


()


 


Total Creatine Kinase


 52 U/L


()


 


Creatine Kinase MB


 1.3 ng/mL


(0.5-3.6)


 


Troponin I


 < 0.02 ng/mL


(0.00-0.05)


 


Total Protein


 6.7 g/dL


(6.4-8.2)


 


Albumin


 3.5 g/dL


(3.4-5.0)


 


Globulin 3.2  











Departure


Time of Disposition:  04:32


Disposition:  01 HOME, SELF-CARE


Impression:  


   Primary Impression:  


   Vertigo


   Additional Impression:  


   Bradycardia


Condition:  Stable


Patient Instructions:  Bradycardia, Vertigo


Referrals:  


LEIDY SÁNCHEZ DO (PCP)


PRIMARY CARE PROVIDER





Additional Instructions:  


return for any worsening symptoms


Duration or Time Spent with Pa:  20





Problem Qualifiers











ODETTE GREY MD           Feb 25, 2020 03:42

## 2020-02-25 NOTE — DIREP
PROCEDURE:CHEST 1 VIEW

 

COMPARISON:[facility], CR, XRAY CHEST SINGLE VW, 08/08/2018, 

09:34 PM.  [facility], CR, XRAY CHEST SINGLE VW, 11/13/2016, 06:19 

PM.

 

INDICATIONS:dizziness

 

FINDINGS:

LUNGS/PLEURA:No significant pulmonary parenchymal abnormalities. No effusions.

VASCULATURE:Normal.  Unremarkable pulmonary vasculature.

CARDIAC:Normal.  No cardiac silhouette abnormality or cardiomegaly. 

MEDIASTINUM:Normal.  No visible mass or adenopathy. 

BONES:Normal.  No fracture or visible bony lesion. 

OTHER:Negative.  

 

CONCLUSION:No acute cardiopulmonary findings.  

 

 

 

 

Dictated by: Foreign Dunbar M.D. on 02/25/2020 at 04:20 AM     

Electronically Signed By: Foreign Dunbar M.D. on 02/25/2020 at 04:21 AM

## 2020-02-25 NOTE — PCM.EKG
[facility]

                                       

Test Date:    2020               Test Time:    04:18:43

Pat Name:     JESÚS VENTURA             Department:   

Patient ID:   [id number]          Room:          

Gender:       F                        Technician:   MA

:          1965               Requested By: JIMBO REDD

Order Number: [id number]           Reading MD:   Jimbo Redd

                                 Measurements

Intervals                              Axis          

Rate:         51                       P:            -4

LA:           147                      QRS:          20

QRSD:         94                       T:            56

QT:           468                                    

QTc:          432                                    

                           Interpretive Statements

Sinus rhythm

Baseline wander in lead(s) V2,V3

Compared to ECG 2018 19:00:01

Sinus bradycardia no longer present



Electronically Signed On 2020 4:27:44 CST by Jimbo Redd



Please click the below link to view image of tracing.

## 2020-07-16 ENCOUNTER — HOSPITAL ENCOUNTER (EMERGENCY)
Dept: HOSPITAL 65 - ER | Age: 55
Discharge: HOME | End: 2020-07-16
Payer: COMMERCIAL

## 2020-07-16 VITALS — DIASTOLIC BLOOD PRESSURE: 62 MMHG | SYSTOLIC BLOOD PRESSURE: 110 MMHG

## 2020-07-16 VITALS — SYSTOLIC BLOOD PRESSURE: 125 MMHG | DIASTOLIC BLOOD PRESSURE: 77 MMHG

## 2020-07-16 VITALS — HEIGHT: 65 IN | WEIGHT: 175 LBS | BODY MASS INDEX: 29.16 KG/M2
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
## 2020-07-16 DIAGNOSIS — Z90.710: ICD-10-CM

## 2020-07-16 DIAGNOSIS — T44.7X5A: ICD-10-CM

## 2020-07-16 DIAGNOSIS — Z79.899: ICD-10-CM

## 2020-07-16 DIAGNOSIS — E87.6: Primary | ICD-10-CM

## 2020-07-16 DIAGNOSIS — E11.65: ICD-10-CM

## 2020-07-16 DIAGNOSIS — Z79.4: ICD-10-CM

## 2020-07-16 LAB
ALP INTEST CFR SERPL: 62 U/L (ref 50–136)
ALT SERPL-CCNC: 37 U/L (ref 12–78)
AST SERPL-CCNC: 20 U/L (ref 0–35)
BASOPHILS # BLD AUTO: 0.1 10^3/UL (ref 0–0.1)
BASOPHILS NFR BLD AUTO: 0.8 % (ref 0–0.2)
CALCIUM SERPL-MCNC: 9.4 MG/DL (ref 8.4–10.5)
CO2 BLDA-SCNC: 31.6 MMOL/L (ref 20–32)
EOSINOPHIL # BLD AUTO: 0.1 10^3/UL (ref 0–0.2)
EOSINOPHIL NFR BLD AUTO: 1.4 % (ref 0–5)
ERYTHROCYTE [DISTWIDTH] IN BLOOD BY AUTOMATED COUNT: 12.4 % (ref 11.5–14.5)
GLUCOSE PRE 100 G GLC PO SERPL-MCNC: 207 MG/DL (ref 70–110)
LYMPHOCYTES # BLD AUTO: 2.48 10^3/UL1 (ref 1–4.8)
LYMPHOCYTES NFR BLD AUTO: 24.6 % (ref 24–44)
MCH RBC QN AUTO: 32.2 PG (ref 26–34)
MONOCYTES # BLD AUTO: 0.7 10^3/UL (ref 0.3–0.8)
MONOCYTES NFR BLD AUTO: 6.4 % (ref 5–12)
NEUTROPHILS # BLD AUTO: 6.7 10^3/UL (ref 1.8–7.7)
NEUTROPHILS NFR BLD AUTO: 66.6 % (ref 41–85)
PLATELET # BLD AUTO: 297 10^3/UL (ref 150–400)

## 2020-07-16 PROCEDURE — 93005 ELECTROCARDIOGRAM TRACING: CPT

## 2020-07-16 PROCEDURE — 36415 COLL VENOUS BLD VENIPUNCTURE: CPT

## 2020-07-16 PROCEDURE — 82948 REAGENT STRIP/BLOOD GLUCOSE: CPT

## 2020-07-16 PROCEDURE — 70450 CT HEAD/BRAIN W/O DYE: CPT

## 2020-07-16 PROCEDURE — 96374 THER/PROPH/DIAG INJ IV PUSH: CPT

## 2020-07-16 PROCEDURE — 82553 CREATINE MB FRACTION: CPT

## 2020-07-16 PROCEDURE — 84484 ASSAY OF TROPONIN QUANT: CPT

## 2020-07-16 PROCEDURE — 80053 COMPREHEN METABOLIC PANEL: CPT

## 2020-07-16 PROCEDURE — 99285 EMERGENCY DEPT VISIT HI MDM: CPT

## 2020-07-16 PROCEDURE — 82550 ASSAY OF CK (CPK): CPT

## 2020-07-16 PROCEDURE — 85025 COMPLETE CBC W/AUTO DIFF WBC: CPT

## 2020-07-16 NOTE — DIREP
PROCEDURE:CT HEAD OR BRAIN W/O CONTRAST

 

COMPARISON:[facility], CT, CT HEAD BRAIN W/O CONTRAST, 

02/25/2020, 04:03 AM.  [facility], CT, CT HEAD BRAIN W/O CONTRAST, 

08/23/2018, 07:29 PM.

 

INDICATIONS:Confusion/dizziness/B hands tingling

 

TECHNIQUE:CT images were created without intravenous contrast.  

 

FINDINGS:

VENTRICLES:The ventricles are normal in size and configuration.  

CEREBRUM:Normal cerebral morphology with appropriate gray white matter 

differentiation.  

CEREBELLUM:Negative.  

BRAINSTEM:Negative.  

BASAL CISTERNS:Negative.  

 

SKULL:Normal.  

SINUSES:Normal.  

OTHER:None  

 

CONCLUSION:No acute intracranial process.

 

 

 

 

Dictated by: KIRILL Bowling M.D. on 07/16/2020 at 04:03 AM     

Electronically Signed By: KIRILL Bowling M.D. on 07/16/2020 at 04:07 AM

## 2020-07-16 NOTE — ER.PDOC
General


Chief Complaint:  General Complaint


Stated Complaint:  HIGH BLOOD SUGAR


Time seen by MD:  03:10


Source:  patient


Exam Limitations:  no limitations





History of Present Illness


Initial Comments


Acute onset at work of confusion, light-headedness.  Noted tingling in B hands. 

No speech abnormalities.  No weakness.  Light-headedness worsened upon standing.

 She checked her blood sugar and noted it to be 281.  States symptoms lasted 

approx 30 min before gradually abating.  Now feels only slightly light-headed 

and "just not quite right".  States she is not taking her med for NIDDM because 

she was prescribed a different medication which required pre-approval and she 

will fill it in AM.  Hx "some kind of heart rhythm issue".  Due for Echo with 

Dr. Johnson.  Recently increased Atenolol from 25mg to 50mg.


Occurred:  other (1 hr PTA)


Severity:  moderate


Associated Symptoms:  light headness, sense of confusion


Decreased Ability to Stand:  off balance


Usually:  walks w/o assistance


Worsened By:  changing position


Prior symptoms/Treatment:  No Similar symptoms previous; Recenly Seen, Treated 

by Doctor


Allergies:  


Coded Allergies:  


     No Known Allergies (Unverified , 11/13/16)


Home Meds


Reported Medications


Esomeprazole Magnesium (NEXIUM) 40 Mg Capsule.dr, 1 CAP PO DAILY, #30 CAP 5 

Refills


   11/13/16


Duloxetine Hcl (CYMBALTA) 60 Mg Capsule.dr, 1 CAP PO DAILY, #90 CAP 3 Refills


   11/13/16


Furosemide (LASIX) 40 Mg Tablet, 1 TAB PO DAILY, #90 TAB 1 Refill


   11/13/16





Past Medical History


Medical History:  cardiac problems, diabetes


Surgical History:  appendectomy, hysterectomy





Social History


Alcohol Use:  none


Drug Use:  none





Review of Systems


Constitutional:  no symptoms reported


Eyes:  no symptoms reported


Ears:  no symptoms reported


Nose:  no symptoms reported


Mouth:  no symptoms reported


Throat:  no symptoms reported


Respiratory:  no symptoms reported


Cardiovascular:  no symptoms reported


Gastrointestinal:  no symptoms reported


Genitourinary:  no symptoms reported


Musculoskeletal:  no symptoms reported


Skin:  no symptoms reported


Psychiatric/Neurological:  see HPI





Physical Exam


General Appearance:  alert, no distress


EENT:  nml eye inspection, PERRL, no nystagmus, nml ENT inspection, pharynx nml,

TM's nml


Neck:  supple


Respiratory:  no resp distress, breath sounds nml


CVS:  reg rate & rhythm, heart sounds.nml


Abdomen:  non-tender, no organomegaly, no distention


Skin:  color nml, no rash, warm/dry


Extremities:  non-tender, nml ROM, no pedal edema


Neuro/Psych:  nml orientation, nml speech/cognition, nml mood/affect


Cranial Nerves:  nml as tested, no evidence of acute CVA


Cerebellar:  nml as tested


Sensorimotor:  nml motor, nml sensation





Results/Orders


Results/Orders





Orders - TERESA MOHR MD


Cbc With Auto Diff (7/16/20 03:23)


Comprehensive Metabolic Panel (7/16/20 03:23)


Creatine Kinase (7/16/20 03:23)


Creatine Kinase Mb (7/16/20 03:23)


Troponin I (7/16/20 03:23)


Ekg-Routine (7/16/20 03:23)


Saline Lock (7/16/20 03:23)


Ct Head Wo Contrast (7/16/20 03:23)


Insulin Regular, Human (Humulin R) (7/16/20 03:30)


Bedside Glucose (7/16/20 03:06)





Vital Signs








  Date Time  Temp Pulse Resp B/P (MAP) Pulse Ox O2 Delivery O2 Flow Rate FiO2


 


7/16/20 04:10 98.0 51 16 110/62 (78) 97 Room Air  


 


7/16/20 03:09 98.0 56 16  100   


 


7/16/20 03:09 98.0 56 16 125/77 (93) 100 Room Air  


 


7/16/20 03:09 98.0 56 16     








Administered Medications








 Medications


  (Trade)  Dose


 Ordered  Sig/Kristyn


 Route


 PRN Reason  Start Time


 Stop Time Status Last Admin


Dose Admin


 


 Insulin Human


 Regular


  (Humulin R)  4 unit  OT  ONCE


 IV


   7/16/20 03:30


 7/16/20 03:31 UNV 7/16/20 03:38


4 UNIT








                                Laboratory Tests








Test


 7/16/20


03:06 7/16/20


03:32


 


POC Glucose


 213 (70 - 110)


H 





 


White Blood Count


 


 10.1 10^3/uL


(4.5-11.0)


 


Red Blood Count


 


 3.73 10^6/uL


(4.00-5.20)  L


 


Hemoglobin


 


 12.0 g/dL


(12.0-15.0)


 


Hematocrit


 


 34.0 %


(36.0-46.0)  L


 


Mean Corpuscular Volume


 


 91.2 fL


()


 


Mean Corpuscular Hemoglobin


 


 32.2 pg


(26-34)


 


Mean Corpuscular Hemoglobin


Concent 


 35.3 g/dL


(33-36.5)


 


Red Cell Distribution Width


 


 12.4 %


(11.5-14.5)


 


Platelet Count


 


 297 10^3/uL


(150-400)


 


Mean Platelet Volume


 


 11.7 fL


(7.8-11.0)  H


 


Neutrophils (%) (Auto)


 


 66.6 %


(41.0-85.0)


 


Lymphocytes (%) (Auto)


 


 24.6 %


(24.0-44.0)


 


Monocytes (%) (Auto)


 


 6.4 %


(5.0-12.0)


 


Neutrophils # (Auto)


 


 6.7 10^3/uL


(1.8-7.7)


 


Lymphocytes # (Auto)


 


 2.48 10^3/uL1


(1.0-4.8)


 


Monocytes # (Auto)


 


 0.7 10^3/uL


(0.3-0.8)


 


Absolute Immature Granulocyte


(auto 


 0.02 10^3 u/L


(0-2)


 


Absolute Eosinophils (auto)


 


 0.1 10^3/uL


(0.0-0.2)


 


Immature Granulocytes %


 


 0.20 %


(0.00-0.50)


 


Eosinophils %


 


 1.4 %


(0.0-5.0)


 


Basophils %


 


 0.8 %


(0.0-0.2)  H


 


Basophils #


 


 0.1 10^3/uL


(0.0-0.1)


 


Sodium Level


 


 134 mmol/L


(132-145)


 


Potassium Level


 


 2.5 mmol/L


(3.6-5.2)  L


 


Chloride Level


 


 94.0 mmol/L


()  L


 


Carbon Dioxide Level


 


 31.6 mmol/L


(20.0-32)


 


Anion Gap  10.9  


 


Blood Urea Nitrogen


 


 17 mg/dL


(7-18)


 


Creatinine


 


 1.26 mg/dL


(0.59-1.40)


 


Estimated GFR (


American) 


 53.5 (>/=60)  





 


Est GFR (CKD-EPI)(Non-Afr


American) 


 44.3 (>/=60)  





 


BUN/Creatinine Ratio  13.0  


 


Glucose Level


 


 207 mg/dL


()  H


 


Calcium Level


 


 9.4 mg/dL


(8.4-10.5)


 


Total Bilirubin


 


 0.3 mg/dL


(0.2-1.0)


 


Aspartate Amino Transferase


(AST) 


 20 U/L (0-35)  





 


Alanine Aminotransferase (ALT)


 


 37 U/L (12-78)





 


Alkaline Phosphatase


 


 62 U/L


()


 


Total Creatine Kinase


 


 72 U/L


()


 


Creatine Kinase MB


 


 0.7 ng/mL


(0.5-3.6)


 


Troponin I


 


 < 0.02 ng/mL


(0.00-0.05)


 


Total Protein


 


 7.5 g/dL


(6.4-8.2)


 


Albumin


 


 3.6 g/dL


(3.4-5.0)


 


Globulin  3.9  











Progress


Progress


Pt's heart rate persistently 49-55.  Suspect adverse reaction to recent increase

in Atenolol dose.  Will recommend reducing dose to 25mg and follow up with Dr. Johnson





EKG/XRAY/CT/US


EKG Comments:  Sinus lexa 56.TWI III





Departure


Time of Disposition:  04:31


Disposition:  01 HOME, SELF-CARE


Impression:  


   Primary Impression:  


   Adverse reaction to beta-blocker


   Additional Impression:  


   Hypokalemia


Condition:  Stable


Referrals:  


KRISTIN BOB MD (PCP)


PRIMARY CARE PROVIDER





Additional Instructions:  


Reduce Atenolol to 25mg/dose.  Follow up with Dr. Johnson and PCP


Duration or Time Spent with Pa:  25





Problem Qualifiers








   Primary Impression:  


   Adverse reaction to beta-blocker


   Encounter type:  initial encounter  Qualified Codes:  T44.7X5A - Adverse 

   effect of beta-adrenoreceptor antagonists, initial encounter








TERESA MOHR MD           Jul 16, 2020 03:26

## 2020-07-16 NOTE — PCM.EKG
[facility]

                                       

Test Date:    2020               Test Time:    03:21:56

Pat Name:     JESÚS VENTURA             Department:   

Patient ID:   [id number]          Room:          

Gender:       F                        Technician:   MERARI

:          1965               Requested By: TERESA MOHR

Order Number: [id number]           Reading MD:     

                                 Measurements

Intervals                              Axis          

Rate:         56                       P:            -13

MD:           173                      QRS:          13

QRSD:         100                      T:            2

QT:           473                                    

QTc:          457                                    

                           Interpretive Statements

Sinus rhythm

No previous ECG available for comparison



Please click the below link to view image of tracing.